# Patient Record
(demographics unavailable — no encounter records)

---

## 2024-12-16 NOTE — HISTORY OF PRESENT ILLNESS
[de-identified] : CC: This is a very pleasant 27-year-old female w/ no sig. PMHx that presents to the office today as a new patient with low back pain.  This back pain for started on 12/1 after she was doing a cycling class.  She stated that there was 1 specific instance where she noted pain immediately and felt like her forearm was not consistent.  She has not cycled since that class.  She went on a trip to [place] where she did extensive walking and with some activity she notices pain in the center of her lumbar spine.  She denies any numbness/tingling down lower extremities.  She has taken Aleve which has mildly helped her symptoms.  She is also utilizing a heating pad.  Denies any previous issues pertaining to her back in the past.  Patient also states that she is having difficulty sleeping due to the pain.  Referring Provider: Dr. Kiya Gandhi  Medications: OCPs Allergies: NKDA Surgical History: None Family History: Noncontributory Occupation:

## 2024-12-16 NOTE — DISCUSSION/SUMMARY
[de-identified] : ASSESSMENT Discussed findings of today's exam and possible cause of patient's pain.  Educated patient on their most probable diagnosis of lumbosacral strain.  Based on her history and physical I believe that she sustained a lumbosacral strain secondary to positioning during cycling class.  At this time, I do not believe this is a radiculopathy based on her very mild improvement along with onset of action. We reviewed possible courses of treatment, and we collaboratively decided best course of treatment at this time includes the following:  PLAN 1. Procedures: None 2. Physical Therapy: Home exercise program provided to patient.  If there is no improvement in symptoms by next visit, we will advise on formal physical therapy to evaluate and treat, modalities and manual treatment as appropriate 3. Medications: Diclofenac 50 mg was prescribed to be taken for the next 7-14 days.  Advised that this is a nonsteroidal anti-inflammatory and to not be taken with any other ibuprofen, Aleve or other medication in the same class.  I also prescribed Flexeril 10 mg to be taken as needed at nighttime, discussed Medrol Dosepak however we will hold off at this time. 4. Imaging: No imaging at this time.  If no improvement in symptoms by next visit, will consider advanced imaging. 5. Labs: None 6. Orthopedic Supplies: None 7. Activity/Restrictions: Patient was counseled regarding activity/activity modification.  Activities as tolerated, avoiding any painful activities with only slow gradual advances as tolerated and once ready.  Advised to pay close attention on whether there is any pain during and/or after the activity, in which case if this occurs, the patient should back off on the activity. 8. Referrals: None 9. Follow-up: 6 weeks  Patient appreciates and agrees with current plan.  This note was generated using Dragon medical dictation software.  A reasonable effort has been made for proofreading its contents, but typos may still remain.  If there are any questions or points of clarification needed, please notify my office.  Tavares Snow MD, CAQSM

## 2024-12-16 NOTE — PHYSICAL EXAM
[de-identified] : LUMBAR SPINE EXAM  Gait: Normal stride length, no trendelenberg gait, no antalgic gait Inspection: No more deformities or malalignment, normal curvature of the lumbosacral spine, good posture, no swelling or ecchymosis Assistive Devices: None  BACK TENDERNESS:  Spinous Processes: None Latissimus Dorsi (adduct, extend, internal rotation humerus) : None Longissimus Thoracis: None Iliocostalis: None Internal Obliques: None Paraspinal muscles (Psoas major, Quadratus Lumborum, Erector spinae group): None SI Joint: None Sacrum: None  ROM:  Flexion: Full but painful 0-60 degrees Extension: Full 0-25 degrees Lateral Flexion (sidebending): Full 0-25 degrees Rotation: Full 0-15 degrees  DERMATOMAL SENSATION (bilateral):  L1 : Intact L2 : Intact L3 : Intact L4: Intact L5: Intact S1: Intact  STRENGTH (bilateral):  Hip Flexion (L2/3): 5/5 Hip Adduction (L2): 5/5 Hip Abduction (L4/5/S1) : 5/5 Knee Extension (L3/4): 5/5 Knee Flexion (L5/S1): 5/5 Foot Plantarflexion (S1/2): 5/5 Ankle Dorsiflexion (L4): 5/5 Great Toe Extension: 5/5   REFLEXES:  Patellar: 2+ bilateral Achilles: 2+ bilateral Clonus: Negative bilateral  SPECIAL TESTING (bilateral):  SUPINE STRAIGHT LEG: Mildly positive bilaterally SLUMP: Negative  SI JOINT:  ASIS DISTRACTION (most specific-hold for 30): Negative ASIS COMPRESSION TEST (hold for 30): Negative JOVANI SI PAIN: Negative  RIGHT HIP:  LOG ROLL: Negative JOVANI: Negative FADIR: Negative  LEFT HIP:  LOG ROLL: Negative JOVANI: Negative FADIR: Negative